# Patient Record
Sex: FEMALE | Race: BLACK OR AFRICAN AMERICAN | Employment: UNEMPLOYED | URBAN - METROPOLITAN AREA
[De-identification: names, ages, dates, MRNs, and addresses within clinical notes are randomized per-mention and may not be internally consistent; named-entity substitution may affect disease eponyms.]

---

## 2017-05-04 ENCOUNTER — ANESTHESIA EVENT (OUTPATIENT)
Dept: ENDOSCOPY | Age: 42
End: 2017-05-04
Payer: MEDICAID

## 2017-05-05 ENCOUNTER — ANESTHESIA (OUTPATIENT)
Dept: ENDOSCOPY | Age: 42
End: 2017-05-05
Payer: MEDICAID

## 2017-05-05 ENCOUNTER — HOSPITAL ENCOUNTER (OUTPATIENT)
Age: 42
Setting detail: OUTPATIENT SURGERY
Discharge: HOME | End: 2017-05-05
Attending: INTERNAL MEDICINE | Admitting: INTERNAL MEDICINE
Payer: MEDICAID

## 2017-05-05 VITALS
DIASTOLIC BLOOD PRESSURE: 66 MMHG | RESPIRATION RATE: 18 BRPM | HEART RATE: 60 BPM | OXYGEN SATURATION: 100 % | WEIGHT: 232.06 LBS | SYSTOLIC BLOOD PRESSURE: 112 MMHG | TEMPERATURE: 97.4 F | BODY MASS INDEX: 36.42 KG/M2 | HEIGHT: 67 IN

## 2017-05-05 PROBLEM — R19.4 CHANGE IN BOWEL HABITS: Status: ACTIVE | Noted: 2017-05-05

## 2017-05-05 LAB
BUN BLD-MCNC: 4 MG/DL (ref 7–18)
CHLORIDE BLD-SCNC: 95 MMOL/L (ref 100–108)
GLUCOSE BLD STRIP.AUTO-MCNC: 99 MG/DL (ref 74–106)
HCT VFR BLD CALC: 36 % (ref 36–49)
HGB BLD-MCNC: 12.2 G/DL (ref 12–16)
POTASSIUM BLD-SCNC: 2.6 MMOL/L (ref 3.5–5.5)
POTASSIUM SERPL-SCNC: 3.1 MMOL/L (ref 3.5–5.5)
SODIUM BLD-SCNC: 140 MMOL/L (ref 136–145)

## 2017-05-05 PROCEDURE — 74011250636 HC RX REV CODE- 250/636: Performed by: NURSE ANESTHETIST, CERTIFIED REGISTERED

## 2017-05-05 PROCEDURE — 82947 ASSAY GLUCOSE BLOOD QUANT: CPT

## 2017-05-05 PROCEDURE — 77030011640 HC PAD GRND REM COVD -A: Performed by: INTERNAL MEDICINE

## 2017-05-05 PROCEDURE — 84132 ASSAY OF SERUM POTASSIUM: CPT | Performed by: ANESTHESIOLOGY

## 2017-05-05 PROCEDURE — 88305 TISSUE EXAM BY PATHOLOGIST: CPT | Performed by: INTERNAL MEDICINE

## 2017-05-05 PROCEDURE — 76040000007: Performed by: INTERNAL MEDICINE

## 2017-05-05 PROCEDURE — 77030031670 HC DEV INFL ENDOTEK BIG60 MRTM -B: Performed by: INTERNAL MEDICINE

## 2017-05-05 PROCEDURE — 77030013991 HC SNR POLYP ENDOSC BSC -A: Performed by: INTERNAL MEDICINE

## 2017-05-05 PROCEDURE — 36415 COLL VENOUS BLD VENIPUNCTURE: CPT | Performed by: ANESTHESIOLOGY

## 2017-05-05 PROCEDURE — 76060000032 HC ANESTHESIA 0.5 TO 1 HR: Performed by: INTERNAL MEDICINE

## 2017-05-05 RX ORDER — SODIUM CHLORIDE, SODIUM LACTATE, POTASSIUM CHLORIDE, CALCIUM CHLORIDE 600; 310; 30; 20 MG/100ML; MG/100ML; MG/100ML; MG/100ML
100 INJECTION, SOLUTION INTRAVENOUS CONTINUOUS
Status: DISCONTINUED | OUTPATIENT
Start: 2017-05-05 | End: 2017-05-05 | Stop reason: HOSPADM

## 2017-05-05 RX ORDER — POTASSIUM CHLORIDE 1.5 G/1.77G
40 POWDER, FOR SOLUTION ORAL
Status: DISCONTINUED | OUTPATIENT
Start: 2017-05-05 | End: 2017-05-05

## 2017-05-05 RX ORDER — PANTOPRAZOLE SODIUM 40 MG/1
40 TABLET, DELAYED RELEASE ORAL DAILY
COMMUNITY
End: 2020-06-24

## 2017-05-05 RX ORDER — LIDOCAINE HYDROCHLORIDE 10 MG/ML
0.1 INJECTION, SOLUTION EPIDURAL; INFILTRATION; INTRACAUDAL; PERINEURAL AS NEEDED
Status: DISCONTINUED | OUTPATIENT
Start: 2017-05-05 | End: 2017-05-05 | Stop reason: HOSPADM

## 2017-05-05 RX ADMIN — SODIUM CHLORIDE, SODIUM LACTATE, POTASSIUM CHLORIDE, AND CALCIUM CHLORIDE: 600; 310; 30; 20 INJECTION, SOLUTION INTRAVENOUS at 13:30

## 2017-05-05 NOTE — DISCHARGE INSTRUCTIONS
For the next 12 hours you should not:   1. drive   2. drink alcohol   3. operate any machinery   4. engage in activities that require mental sharpness or manual dexterity    5. take any drugs other than those prescribed by a physician   6. make any legal or financial decisions    Call your doctor's office immediately, if:   1. Severe rectal bleeding   2. High fever   3. Severe abdominal pain    Take it easy today and resume normal activity tomorrow. Resume previous diet. Home Melendez MD, FACP    DISCHARGE SUMMARY from Nurse    The following personal items are in your possession at time of discharge:    Dental Appliances: None  Visual Aid: Glasses                            PATIENT INSTRUCTIONS:    After general anesthesia or intravenous sedation, for 24 hours or while taking prescription Narcotics:  · Limit your activities  · Do not drive and operate hazardous machinery  · Do not make important personal or business decisions  · Do  not drink alcoholic beverages  · If you have not urinated within 8 hours after discharge, please contact your surgeon on call. Report the following to your surgeon:  · Excessive pain, swelling, redness or odor of or around the surgical area  · Temperature over 100.5  · Nausea and vomiting lasting longer than 4 hours or if unable to take medications  · Any signs of decreased circulation or nerve impairment to extremity: change in color, persistent  numbness, tingling, coldness or increase pain  · Any questions        What to do at Home:        These are general instructions for a healthy lifestyle:    No smoking/ No tobacco products/ Avoid exposure to second hand smoke    Surgeon General's Warning:  Quitting smoking now greatly reduces serious risk to your health.     Obesity, smoking, and sedentary lifestyle greatly increases your risk for illness    A healthy diet, regular physical exercise & weight monitoring are important for maintaining a healthy lifestyle    You may be retaining fluid if you have a history of heart failure or if you experience any of the following symptoms:  Weight gain of 3 pounds or more overnight or 5 pounds in a week, increased swelling in our hands or feet or shortness of breath while lying flat in bed. Please call your doctor as soon as you notice any of these symptoms; do not wait until your next office visit. Recognize signs and symptoms of STROKE:    F-face looks uneven    A-arms unable to move or move unevenly    S-speech slurred or non-existent    T-time-call 911 as soon as signs and symptoms begin-DO NOT go       Back to bed or wait to see if you get better-TIME IS BRAIN. Warning Signs of HEART ATTACK     Call 911 if you have these symptoms:   Chest discomfort. Most heart attacks involve discomfort in the center of the chest that lasts more than a few minutes, or that goes away and comes back. It can feel like uncomfortable pressure, squeezing, fullness, or pain.  Discomfort in other areas of the upper body. Symptoms can include pain or discomfort in one or both arms, the back, neck, jaw, or stomach.  Shortness of breath with or without chest discomfort.  Other signs may include breaking out in a cold sweat, nausea, or lightheadedness. Don't wait more than five minutes to call 911 - MINUTES MATTER! Fast action can save your life. Calling 911 is almost always the fastest way to get lifesaving treatment. Emergency Medical Services staff can begin treatment when they arrive -- up to an hour sooner than if someone gets to the hospital by car. The discharge information has been reviewed with the patient. The patient verbalized understanding. Discharge medications reviewed with the patient and appropriate educational materials and side effects teaching were provided. Patient armband removed and given to patient to take home.   Patient was informed of the privacy risks if armband lost or stolen

## 2017-05-05 NOTE — ANESTHESIA PREPROCEDURE EVALUATION
Anesthetic History   No history of anesthetic complications            Review of Systems / Medical History  Patient summary reviewed and pertinent labs reviewed    Pulmonary  Within defined limits                 Neuro/Psych   Within defined limits           Cardiovascular    Hypertension              Exercise tolerance: >4 METS     GI/Hepatic/Renal     GERD           Endo/Other        Obesity    Comments: hypokalemia Other Findings   Comments:   Risk Factors for Postoperative nausea/vomiting:       History of postoperative nausea/vomiting? NO       Female? YES       Motion sickness? NO       Intended opioid administration for postoperative analgesia? NO      Smoking Abstinence  Current Smoker? NO  Elective Surgery? YES  Seen preoperatively by anesthesiologist or proxy prior to day of surgery? YES  Pt abstained from smoking 24 hours prior to anesthesia?  YES               Physical Exam    Airway  Mallampati: II  TM Distance: 4 - 6 cm  Neck ROM: normal range of motion   Mouth opening: Normal     Cardiovascular    Rhythm: regular  Rate: normal         Dental  No notable dental hx       Pulmonary  Breath sounds clear to auscultation               Abdominal  GI exam deferred       Other Findings            Anesthetic Plan    ASA: 2  Anesthesia type: MAC          Induction: Intravenous  Anesthetic plan and risks discussed with: Patient

## 2017-05-05 NOTE — ANESTHESIA POSTPROCEDURE EVALUATION
Post-Anesthesia Evaluation & Assessment    Visit Vitals    /65 (BP 1 Location: Left arm, BP Patient Position: At rest)    Pulse 66    Temp 36.3 °C (97.4 °F)    Resp 18    Ht 5' 7\" (1.702 m)    Wt 105.3 kg (232 lb 1 oz)    SpO2 100%    BMI 36.35 kg/m2       Nausea/Vomiting: no nausea and no vomiting    Pain score (VAS): 0    Post-operative hydration adequate.     Mental status & Level of consciousness: orientation per pre-anesthetic level    Neurological status: moves all extremities, sensation grossly intact    Pulmonary status: airway patent, no supplemental oxygen required    Complications related to anesthesia: none    Additional comments:        Kofi Poe CRNA  May 5, 2017

## 2017-05-05 NOTE — IP AVS SNAPSHOT
[address] [place] [address] [address] 
[phone] Patient: Dorina Thomas MRN: [id number] PBB:7[birth year]89/[birth year]26 You are allergic to the following No active allergies Recent Documentation Height Weight BMI OB Status Smoking Status 1.702 m 105.3 kg 36.35 kg/m2 Hysterectomy Never Smoker Emergency Contacts Name Discharge Info Relation Home Work Mobile Fidencio Sandoval DISCHARGE CAREGIVER [3] Boyfriend [17]   [phone] [address] CAREGIVER [3] Parent [1] [phone] About your hospitalization You were admitted on: May 5, 2017 You last received care in the:  [facility] PHASE 2 RECOVERY You were discharged on: May 5, 2017 Unit phone number:  [phone] Why you were hospitalized Your primary diagnosis was:  Change In Bowel Habits Providers Seen During Your Hospitalizations Provider Role Specialty Primary office phone Mansi Larsen MD Attending Provider Gastroenterology [phone] Your Primary Care Physician (PCP) Primary Care Physician Office Phone Office Fax OTHER, PHYS ** None ** ** None ** Follow-up Information Follow up With Details Comments Contact Info Praveena Medina MD Schedule an appointment as soon as possible for a visit  [facility] [address] [phone] Mansi Larsen MD Schedule an appointment as soon as possible for a visit in 3 months  [facility] [phone] Phys MD Robin   Patient can only remember the practice name and not the physician Current Discharge Medication List  
  
CONTINUE these medications which have NOT CHANGED Dose & Instructions Dispensing Information Comments Morning Noon Evening Bedtime  
 atenolol-chlorthalidone 50-25 mg per tablet Commonly known as:  Phylicia Mojica Your last dose was: Your next dose is:    
   
   
 Dose:  1 Tab Take 1 Tab by mouth daily. Refills:  0  
     
   
   
   
  
 potassium chloride 20 mEq tablet Commonly known as:  K-DUR, KLOR-CON Your last dose was: Your next dose is:    
   
   
 Dose:  20 mEq Take 20 mEq by mouth daily. Refills:  0 PROTONIX 40 mg tablet Generic drug:  pantoprazole Your last dose was: Your next dose is:    
   
   
 Dose:  40 mg Take 40 mg by mouth daily. Refills:  0 Discharge Instructions For the next 12 hours you should not: 1. drive 2. drink alcohol 3. operate any machinery 4. engage in activities that require mental sharpness or manual dexterity 5. take any drugs other than those prescribed by a physician 6. make any legal or financial decisions Call your doctor's office immediately, if: 1. Severe rectal bleeding 2. High fever 3. Severe abdominal pain Take it easy today and resume normal activity tomorrow. Resume previous diet. Home Oneil MD, [place] DISCHARGE SUMMARY from Nurse The following personal items are in your possession at time of discharge: 
 
Dental Appliances: None Visual Aid: Glasses PATIENT INSTRUCTIONS: 
 
After general anesthesia or intravenous sedation, for 24 hours or while taking prescription Narcotics: · Limit your activities · Do not drive and operate hazardous machinery · Do not make important personal or business decisions · Do  not drink alcoholic beverages · If you have not urinated within 8 hours after discharge, please contact your surgeon on call. Report the following to your surgeon: 
· Excessive pain, swelling, redness or odor of or around the surgical area · Temperature over 100.5 · Nausea and vomiting lasting longer than 4 hours or if unable to take medications · Any signs of decreased circulation or nerve impairment to extremity: change in color, persistent  numbness, tingling, coldness or increase pain · Any questions What to do at Home: These are general instructions for a healthy lifestyle: No smoking/ No tobacco products/ Avoid exposure to second hand smoke Surgeon General's Warning:  Quitting smoking now greatly reduces serious risk to your health. Obesity, smoking, and sedentary lifestyle greatly increases your risk for illness A healthy diet, regular physical exercise & weight monitoring are important for maintaining a healthy lifestyle You may be retaining fluid if you have a history of heart failure or if you experience any of the following symptoms:  Weight gain of 3 pounds or more overnight or 5 pounds in a week, increased swelling in our hands or feet or shortness of breath while lying flat in bed. Please call your doctor as soon as you notice any of these symptoms; do not wait until your next office visit. Recognize signs and symptoms of STROKE: 
 
F-face looks uneven A-arms unable to move or move unevenly S-speech slurred or non-existent T-time-call 911 as soon as signs and symptoms begin-DO NOT go Back to bed or wait to see if you get better-TIME IS BRAIN. Warning Signs of HEART ATTACK Call 911 if you have these symptoms: 
? Chest discomfort. Most heart attacks involve discomfort in the center of the chest that lasts more than a few minutes, or that goes away and comes back. It can feel like uncomfortable pressure, squeezing, fullness, or pain. ? Discomfort in other areas of the upper body. Symptoms can include pain or discomfort in one or both arms, the back, neck, jaw, or stomach. ? Shortness of breath with or without chest discomfort. ? Other signs may include breaking out in a cold sweat, nausea, or lightheadedness. Don't wait more than five minutes to call [address]!  Fast action can save your life. Calling 911 is almost always the fastest way to get lifesaving treatment. Emergency Medical Services staff can begin treatment when they arrive  up to an hour sooner than if someone gets to the hospital by car. The discharge information has been reviewed with the patient. The patient verbalized understanding. Discharge medications reviewed with the patient and appropriate educational materials and side effects teaching were provided. Patient armband removed and given to patient to take home. Patient was informed of the privacy risks if armband lost or stolen Discharge Orders None Introducing [facility]! Toribio Nyhan introduces [employer] patient portal. Now you can access parts of your medical record, email your doctor's office, and request medication refills online. 1. In your internet browser, go to [email or web address]. [email or web address] 2. Click on the First Time User? Click Here link in the Sign In box. You will see the New Member Sign Up page. 3. Enter your [employer] Access Code exactly as it appears below. You will not need to use this code after youve completed the sign-up process. If you do not sign up before the expiration date, you must request a new code. · [employer] Access Code: [email or web address] Expires: 7/30/2017 12:36 PM 
 
4. Enter the last four digits of your Social Security Number (xxxx) and Date of Birth (mm/dd/yyyy) as indicated and click Submit. You will be taken to the next sign-up page. 5. Create a [employer] ID. This will be your [employer] login ID and cannot be changed, so think of one that is secure and easy to remember. 6. Create a [employer] password. You can change your password at any time. 7. Enter your Password Reset Question and Answer. This can be used at a later time if you forget your password. 8. Enter your e-mail address.  You will receive e-mail notification when new information is available in [employer]. 9. Click Sign Up. You can now view and download portions of your medical record. 10. Click the Download Summary menu link to download a portable copy of your medical information. If you have questions, please visit the Frequently Asked Questions section of the [employer] website. Remember, [employer] is NOT to be used for urgent needs. For medical emergencies, dial 911. Now available from your iPhone and Android! General Information Please provide this summary of care documentation to your next provider. Patient Signature:  ____________________________________________________________ Date:  ____________________________________________________________  
  
Massiel Brought Provider Signature:  ____________________________________________________________ Date:  ____________________________________________________________

## 2017-05-05 NOTE — H&P
History and Physical    Renteria Click  1975  [id number]  [id number]    Assessment:  Change in bowel habits    Treatment/Plan:  Colonoscopy    History of present illness:  40 y.o. female here for diagnostic colonoscopy to eval her change in bowel habits. ROS: No chest pain, shortness of breath, headaches, dizziness, lightheadedness, nausea, vomiting or abdominal pain. Evaluation of past illnesses, surgeries, or injuries:  yes    Past Medical History:   Diagnosis Date    GERD (gastroesophageal reflux disease)     Hypertension     Low blood potassium        Past Surgical History:   Procedure Laterality Date    HX  SECTION      HX HYSTERECTOMY         Allergies:  No Known Allergies    Previous reactions to sedation/analgesia? no     Review of current medications, supplement, herbals and nutraceuticals complete:  yes    Pertinent labs reviewed? yes    History of active substance abuse?  no    History reviewed. No pertinent family history. Social History     Social History    Marital status:      Spouse name: N/A    Number of children: N/A    Years of education: N/A     Occupational History    Not on file. Social History Main Topics    Smoking status: Never Smoker    Smokeless tobacco: Never Used    Alcohol use Yes    Drug use: No    Sexual activity: Not on file     Other Topics Concern    Not on file     Social History Narrative       Examination:  Cardiac Status:  WNL    Mental Status:  WNL     Pulmonary Status:  WNL    NPO:  >12    Home Miner MD, [place]  2017  12:37 PM

## 2017-05-05 NOTE — PROCEDURES
Colonoscopy Report     Date of Procedure:2017       Patient: Tonia Grider  Date of Birth: @     MRN: [id number]   Age: 40 y.o.   SSN: [id number]  Sex: female            FINDINGS & IMPRESSION:  1.   10 mm sessile polyp at the mesenteric side of the hepatic flexure -- hot snare resected. 2.   Mild sigmoid diverticulosis. 3.   Small hemorrhoids. 4.   Normal terminal ileum. RECOMMENDATIONS  1. Resume all home medications and diet. Continue daily Citrucel. Drink 5-6 glasses water daily. 2. Await results of biopsies. Patient advised results will be mailed in 1-2 weeks. 3. Repeat colonoscopy in 3 years if polyp has villous histology. Repeat in 5 years if a non-villous adenoma. Repeat in 10 years if hyperplastic. 4. RTO in 3 months. Indication:  Change in bowel habits  Procedure Performed: Colonoscopy polypectomy (snare cautery)  Endoscopist: Ca Gunn MD  Assistant: Endoscopy Technician-1: Dara Eugene  Endoscopy RN-1: Gus Larios RN  ASA: ASA 2 - Patient with mild systemic disease with no functional limitations  Mallampati Score: II (soft palate, uvula, fauces visible)  Anesthesia: MAC anesthesia Propofol  Endoscope: [id number]  Extent of Examination:cecum, which was identified by the ileocecal valve and appendiceal orifice, terminal ileum  Quality of Preparation:     [x]  Excellent   []  Very Good   [] Fair but adequate   [] Fair, inadequate   []  Poor      Technique: Informed consent was obtained. A safety timeout was performed. The patient was placed in left lateral position. A perianal inspection and a digital rectal exam were performed. The patients vital signs were monitored at all times including heart rate, rhythm, blood pressure and oxygen saturation. Sedation/anesthesia was administered.   When adequate sedation was achieved the video colonoscope was introduced into the rectum and advanced under direct vision up to the cecum, which was identified by the ileocecal valve and appendiceal orifice. The cecum was identified by ileocecal valve, appendiceal orifice and confluence of the colonic folds. The terminal ileum was intubated. With adequate insufflation and maneuvering of the withdrawing scope, the colonic mucosa was visualized carefully. Retroflexion was performed in the rectum and the distal rectum visualized. The patient tolerated the procedure very well and was transferred to recovery area. EBL:   Mild    Specimen:   ID Type Source Tests Collected by Time Destination   1 : (hot snare) polyp  Preservative Hepatic Flexure  Tabitha Kocher, MD 5/5/2017 1407 Pathology         Christiano Wright MD, [place] [facility]  [facility]  May 5, 2017  2:13 PM

## 2018-08-09 PROBLEM — E66.01 SEVERE OBESITY (BMI 35.0-39.9): Status: ACTIVE | Noted: 2018-08-09

## 2019-03-28 PROBLEM — R73.03 PRE-DIABETES: Status: ACTIVE | Noted: 2019-01-13

## 2019-03-28 PROBLEM — G47.33 OSA (OBSTRUCTIVE SLEEP APNEA): Status: ACTIVE | Noted: 2018-01-26

## 2019-04-04 ENCOUNTER — HOSPITAL ENCOUNTER (OUTPATIENT)
Dept: PHYSICAL THERAPY | Age: 44
Discharge: HOME | End: 2019-04-04
Payer: MEDICAID

## 2019-04-04 PROCEDURE — 97112 NEUROMUSCULAR REEDUCATION: CPT

## 2019-04-04 PROCEDURE — 97161 PT EVAL LOW COMPLEX 20 MIN: CPT

## 2019-04-04 NOTE — PROGRESS NOTES
PELVIC FLOOR DAILY TREATMENT NOTE 8-14    Patient Name: Angel Goldberg  Date:2019  : 1975  [x]  Patient  Verified  Payor: [place]yenne Arredondo / Plan: [facility] MEDICAID / Product Type: Managed Care Medicaid /    In time:12:16  Out time:1:08  Total Treatment Time (min): 52  Total Timed Codes (min): na  1:1 Treatment Time (min): na   Visit #: 1 of 8    Treatment Area: Stress incontinence (female) (male) [N39.3]    SUBJECTIVE  Pain Level (0-10 scale): 0  Any medication changes, allergies to medications, adverse drug reactions, diagnosis change, or new procedure performed?: [x] No    [] Yes (see summary sheet for update)  Subjective functional status/changes:   [] No changes reported  See medical history    OBJECTIVE            10 min Patient Education: [x] Review HEP    [] Progressed/Changed HEP based on: Educated Pt in pelvic floor anatomy, function/dysfunction and correct execution of a pelvic floor contraction. Reviewed biofeedback results. [] positioning   [] body mechanics   [] transfers   [] heat/ice application        Other Objective/Functional Measures: See POC    Pain Level (0-10 scale) post treatment: 0    ASSESSMENT/Changes in Function: Justification for Eval Code Complexity:  Patient History : See POC  Examination see exam   Clinical Presentation: stable  Clinical Decision Making : FOTO : 62 /100      Patient will continue to benefit from skilled PT services to modify and progress therapeutic interventions, address strength deficits, instruct in home and community integration and address TUTU to attain remaining goals. [x]  See Plan of Care  []  See progress note/recertification  []  See Discharge Summary         Progress towards goals / Updated goals:  Initial evaluation completed with home exercise program and education initiated.       PLAN  [x]  Upgrade activities as tolerated     []  Continue plan of care  []  Update interventions per flow sheet       []  Discharge due to:_  []  Other:_ Angy Colbert, PT 4/4/2019  1:08 PM      Future Appointments   Date Time Provider Hiro Audelia   4/8/2019  2:30 PM Bora Ele, PTA [facility]   4/18/2019 10:45 AM Meliton Richardson, PT [facility]   4/26/2019  3:00 PM Bora Ele, PTA [facility]   5/1/2019  7:30 AM Meliton Richardson, PT [facility]   5/6/2019  1:00 PM Bora Ele, PTA [facility]   5/13/2019  1:00 PM Bora Ele, PTA [facility]   5/20/2019  1:00 PM Bora Ele, PTA [facility]   7/29/2019  8:00 AM Reji, Nicol Sorensen, NP [address]

## 2019-04-04 NOTE — PROGRESS NOTES
[address]  THERAPY AT [facility], [address] [facility] [address] - Phone: [phone]  Fax: [phone] / [address] [facility]  Patient Name: Mark Anthony Darnell : 1975   Medical   Diagnosis: Stress incontinence (female) (male) [N39.3] Treatment Diagnosis: Stress incontinence (female) (male) [N39.3]   Onset Date: 3/29/2019     Referral Source: [place], NP Start of Care [facility]): 2019   Prior Hospitalization: See medical history Provider #: [id number]   Prior Level of Function: Chronic TUTU   Comorbidities: G3, P3, Hysterectomy   Medications: Verified on Patient Summary List   The Plan of Care and following information is based on the information from the initial evaluation.   ==================================================================================  Assessment / key information: Patient is a 44 y.o. yo female  with 2 vaginal deliveries and 1  section who presents to [facility] with diagnosis of Stress incontinence (female) (male) [N39.3]. Patient reports urinary leaking with hard coughing and sexual intercourse and nocturia 2x nightly. She denies urgency or perineal pressure. Patient presents to PT with moderately impaired strength of pelvic floor muscles. On biofeedback there was low  net rise of fast twitch contraction at 7.79 microvolts and low net rise of slow twitch contraction at 7.76 microvolts. Patient declined internal evaluation of pelvic floor. Patient scored 57 on FOTO/Urinary problem indicating decreased quality of life.   Patient can benefit from PT for retraining of muscle control on biofeedback to increase pelvic floor muscle strength, decrease urinary incontinence and nocturia.    ==================================================================================  Eval Complexity: History: MEDIUM  Complexity : 1-2 comorbidities / personal factors will impact the outcome/ POC Exam:LOW Complexity : 1-2 Standardized tests and measures addressing body structure, function, activity limitation and / or participation in recreation  Presentation: LOW Complexity : Stable, uncomplicated  Clinical Decision Making:MEDIUM Complexity : FOTO score of 26-74Overall Complexity:LOW   Problem List: Pelvic pain/dysfunction, Decreased pelvic floor mm awareness, Decreased pelvic floor mm strength and Other  Treatment Plan may include any combination of the following: Therapeutic exercise, Neuromuscular re-education, Manual therapy, Physical agent/modality, Patient education and Other  Patient / Family readiness to learn indicated by: asking questions, trying to perform skills and interest  Persons(s) to be included in education: patient (P)  Barriers to Learning/Limitations: None  Measures taken:    Patient Goal (s): \"To stop peeing on myself when I cough\". Patient self reported health status: good  Rehabilitation Potential: excellent  Short Term Goals: To be accomplished in 4 weeks:   1. Patient performing pelvic floor exercises TID. 2. Patient will report 25% subjective improvement in urinary incontinence with coughing and sexual intercourse. 3. Increase net rise of fast twitch contraction to 10 microvolts to increase continence. 4. Patient doing sleep longer exercises. .  Long Term Goals: To be accomplished in 8 weeks:   1. Patient independent in HEP     2. Patient will increase score on FOTO/Urinary problem to 63 indicating improved continence and quality of life. 3. Increase net rise of fast twitch contraction to 12 microvolts to increase continence. 4. Nocturia decreased to 0- 1x nightly. Frequency / Duration:   Patient to be seen  1  times per week for 8  weeks:  Patient / Caregiver education and instruction:Exercises  G-Codes (GP): leeanne    Therapist Signature:  [place] OZIEL Amanda Date: 2/6/1722   Certification Period: leeanne Time: 12:18 PM ==================================================================================  I certify that the above Physical Therapy Services are being furnished while the patient is under my care. I agree with the treatment plan and certify that this therapy is necessary. Physician Signature:        Date:       Time:     [facility] or you may fax the signed copy to [phone]. Thank you. To ensure your patient receives the highest quality care and to avoid disruption in therapy please sign and return this plan of care within 21 days. Per Medicaid guidelines if the plan of care is not received within 21 days the patient's care must be put on hold until signed.

## 2019-04-08 ENCOUNTER — HOSPITAL ENCOUNTER (OUTPATIENT)
Dept: PHYSICAL THERAPY | Age: 44
Discharge: HOME | End: 2019-04-08
Payer: MEDICAID

## 2019-04-08 PROCEDURE — 97110 THERAPEUTIC EXERCISES: CPT

## 2019-04-08 PROCEDURE — 97112 NEUROMUSCULAR REEDUCATION: CPT

## 2019-04-08 NOTE — PROGRESS NOTES
PELVIC FLOOR DAILY TREATMENT NOTE     Patient Name: Miguel Eduardo  Date:2019  : 1975  [x]  Patient  Verified  Payor: Sophy Mello / Plan: salgomed / Product Type: Managed Care Medicaid /    In time:2:38 pm  Out time:3:25 pm  Total Treatment Time (min): 43  Total Timed Codes (min): 43  1:1 Treatment Time (min): 43   Visit #: 2 of 8    Treatment Area: Stress incontinence (female) (male) [N39.3]    SUBJECTIVE  Pain Level (0-10 scale): 0/10  Any medication changes, allergies to medications, adverse drug reactions, diagnosis change, or new procedure performed?: [x] No    [] Yes (see summary sheet for update)  Subjective functional status/changes:   [] No changes reported  Pt reports doing HEP 3x per day.  As long as I don't get sick    OBJECTIVE  Modality rationale:  Biofeedback for retraining of muscle control on biofeedback to increase pelvic floor muscle strength, decrease urinary incontinence and nocturia       Min Type Additional Details   33 [x] Biofeedback x33 minutes    supine surface    [] Estim: []Att   []Unatt        []TENS instruct                  []IFC  []Premod   []NMES                     []Other:  []w/US   []w/ice   []w/heat  Position:  Location:    []  Traction: [] Cervical       []Lumbar                       [] Prone          []Supine                       []Intermittent   []Continuous Lbs:  [] before manual  [] after manual    []  Ultrasound: []Continuous   [] Pulsed                           []1MHz   []3MHz Location:  W/cm2:    []  Iontophoresis with dexamethasone         Location: [] Take home patch   [] In clinic    []  Ice     []  heat  []  Ice massage Position:  Location:    []  Vasopneumatic Device Pressure:       [] lo [] med [] hi   Temperature: [] lo [] med [] hi   [x] Skin assessment post-treatment:  []intact []redness- no adverse reaction       []redness - adverse reaction:     7 min Therapeutic Exercise:  [x] See flow sheet :  []  Pelvic floor strengthening []  Pelvic floor downtraining  []  Quality pelvic floor contractions      []  Relaxation techniques  []  Urge suppression exercises  [x]  Other: Core strengthening   Rationale: increase ROM and increase strength to improve the patients ability to improve core strength in ADLS, UI      3 min Bladder Training: [] voiding schedule          [] program progression                                  [x]review sleep longer strategy             min Patient Education: [x] Review HEP    [] Progressed/Changed HEP based on:   [] positioning   [] body mechanics   [] transfers   [] heat/ice application        Other Objective/Functional Measures:    []baseline resting tone: -   [x]slow twitch mms 4.73 ( 3.18)   [x]fast twitch mms3.77( -4.43)    Pain Level (0-10 scale) post treatment: 0    ASSESSMENT/Changes in Function: pt demonstrating improving pelvic floor engagement with use of hip adduction. Slow twitch mms improving from 4.73 to 14.5 and fast twitch mms improving from 3.77 to 19.5. Reviewed sleep longer strategies. Pt verbalizaed good understanding of written HEP. Add hip ADD, abdominal draw to progress core strength    Patient will continue to benefit from skilled PT services to modify and progress therapeutic interventions, address functional mobility deficits, address ROM deficits, address strength deficits, analyze and address soft tissue restrictions and instruct in home and community integration to attain remaining goals. []  See Plan of Care  []  See progress note/recertification  []  See Discharge Summary         Progress towards goals / Updated goals:  First visit from initial evaluation.  Progressed treatment per plan of care    PLAN  [x]  Upgrade activities as tolerated     [x]  Continue plan of care  []  Update interventions per flow sheet       []  Discharge due to:_  []  Other:_      Bandar Sen, LISETTE 4/8/2019  2:38 PM      Future Appointments   Date Time Provider Hiro Win 4/18/2019 10:45 AM [facility]   4/26/2019  3:00 PM Claudia Morales PTA [facility]   5/1/2019  7:30 AM Gorge Almonte, PT [facility]   5/6/2019  1:00 PM Claudia Morales PTA [facility]   5/13/2019  1:00 PM Claudia Morales PTA [facility]   5/20/2019  1:00 PM Claudia Morales PTA [facility]   7/29/2019  8:00 AM [place], Dominga Perkins, NP [address] [facility]

## 2019-04-18 ENCOUNTER — HOSPITAL ENCOUNTER (OUTPATIENT)
Dept: PHYSICAL THERAPY | Age: 44
Discharge: HOME | End: 2019-04-18
Payer: MEDICAID

## 2019-04-18 PROCEDURE — 97112 NEUROMUSCULAR REEDUCATION: CPT

## 2019-04-18 PROCEDURE — 97110 THERAPEUTIC EXERCISES: CPT

## 2019-04-18 NOTE — PROGRESS NOTES
PELVIC FLOOR DAILY TREATMENT NOTE 8-    Patient Name: Gregorio Rutherford  Date:2019  : 1975  [x]  Patient  Verified  Payor: Radha Trinidad / Plan: [id number] / Product Type: Managed Care Medicaid /    In time:10:59 am  Out time: 11:32 am  Total Treatment Time (min): 33  Total Timed Codes (min): na  1:1 Treatment Time (min): na   Visit #: 4 of 8    Treatment Area: Stress incontinence (female) (male) [N39.3]    SUBJECTIVE  Pain Level (0-10 scale): 0/10  Any medication changes, allergies to medications, adverse drug reactions, diagnosis change, or new procedure performed?: [x] No    [] Yes (see summary sheet for update)  Subjective functional status/changes:   [] No changes reported  Pt reports that it's kind of working since having less leaking. \"I don't smell like pee anymore. \"  Did sleep longer exercises. OBJECTIVE  Modality rationale:  Biofeedback for retraining of muscle control on biofeedback to increase pelvic floor muscle strength, decrease urinary incontinence and nocturia       Min Type Additional Details   23 [x] Biofeedback x 23 minutes    supine and sit surface with accessory muscle use.     [] Estim: []Att   []Unatt        []TENS instruct                  []IFC  []Premod   []NMES                     []Other:  []w/US   []w/ice   []w/heat  Position:  Location:    []  Traction: [] Cervical       []Lumbar                       [] Prone          []Supine                       []Intermittent   []Continuous Lbs:  [] before manual  [] after manual    []  Ultrasound: []Continuous   [] Pulsed                           []1MHz   []3MHz Location:  W/cm2:    []  Iontophoresis with dexamethasone         Location: [] Take home patch   [] In clinic    []  Ice     []  heat  []  Ice massage Position:  Location:    []  Vasopneumatic Device Pressure:       [] lo [] med [] hi   Temperature: [] lo [] med [] hi   [x] Skin assessment post-treatment:  []intact []redness- no adverse reaction []redness - adverse reaction:     10 min Therapeutic Exercise:  [x] See flow sheet :  []  Pelvic floor strengthening                []  Pelvic floor downtraining  []  Quality pelvic floor contractions      []  Relaxation techniques  []  Urge suppression exercises  [x]  Other: Core strengthening   Rationale: increase ROM and increase strength to improve the patients ability to improve core strength in ADLS, UI      na min Bladder Training: [] voiding schedule          [] program progression                                  []review sleep longer strategy             min Patient Education: [x] Review HEP    [] Progressed/Changed HEP based on: Core exercises 3x week. PF 1x day supine, 2x day seated  [] positioning   [] body mechanics   [] transfers   [] heat/ice application        Other Objective/Functional Measures:    []baseline resting tone: -   [x]slow twitch mms 23(17.8) with add   [x]fast twitch mms 19.6(9.4) with add    Pain Level (0-10 scale) post treatment: 0    ASSESSMENT/Changes in Function:   Patient able to generate an improved PF contraction with accessory muscle use. She reports increased continence. Patient will continue to benefit from skilled PT services to modify and progress therapeutic interventions, address functional mobility deficits, address ROM deficits, address strength deficits, analyze and address soft tissue restrictions and instruct in home and community integration to attain remaining goals. []  See Plan of Care  []  See progress note/recertification  []  See Discharge Summary         Progress towards goals / Updated goals:                              1. Patient performing pelvic floor exercises 3x day. Met                  2. Patient will report 25% subjective improvement in urinary incontinence with coughing and sexual intercourse. 3. Increase net rise of fast twitch contraction to 10 microvolts to increase continence.  Progressing                 4. Patient doing sleep longer exercises. . Met        PLAN  [x]  Upgrade activities as tolerated     [x]  Continue plan of care  []  Update interventions per flow sheet       []  Discharge due to:_  []  Other:_      Ping Pierce, PT 4/18/2019  11:32 AM      Future Appointments   Date Time Provider Hiro Win   4/26/2019  3:00 PM Robyn Amezquita PTA [facility]   5/1/2019  7:30 AM Julieta Torres, PT [facility]   5/6/2019  1:00 PM Robyn Amezquita PTA [facility]   5/13/2019  1:00 PM Robyn Amezquita [facility]   5/20/2019  1:00 PM Robyn Amezquita [facility]   7/29/2019  8:00 AM Reji, Uri Govea, NP [address]

## 2019-04-26 ENCOUNTER — HOSPITAL ENCOUNTER (OUTPATIENT)
Dept: PHYSICAL THERAPY | Age: 44
Discharge: HOME | End: 2019-04-26
Payer: MEDICAID

## 2019-04-26 PROCEDURE — 97112 NEUROMUSCULAR REEDUCATION: CPT

## 2019-04-26 PROCEDURE — 97110 THERAPEUTIC EXERCISES: CPT

## 2019-04-26 NOTE — PROGRESS NOTES
PELVIC FLOOR DAILY TREATMENT NOTE 8    Patient Name: Maycol Jacobs  Date:2019  : 1975  [x]  Patient  Verified  Payor: Nicole Givens / Plan: Dalhart Lower / Product Type: Managed Care Medicaid /    In time:3:16 am Out time:4:07 pm  Total Treatment Time (min): 51  Total Timed Codes (min): 51  1:1 Treatment Time (min): 46   Visit #: 5of 8    Treatment Area: Stress incontinence (female) (male) [N39.3]    SUBJECTIVE  Pain Level (0-10 scale): 4/ 1- lower abdominal  Any medication changes, allergies to medications, adverse drug reactions, diagnosis change, or new procedure performed?: [x] No    [] Yes (see summary sheet for update)  Subjective functional status/changes:   [] No changes reported  No more smelling like pee pee. Its working I guess.      OBJECTIVE  Modality rationale:  Biofeedback for retraining of muscle control on biofeedback to increase pelvic floor muscle strength, decrease urinary incontinence and nocturia      Min Type Additional Details   28 [] Biofeedback x 28 minutes   supine and sit surface with accessory muscle use    [] Estim: []Att   []Unatt        []TENS instruct                  []IFC  []Premod   []NMES                     []Other:  []w/US   []w/ice   []w/heat  Position:  Location:    []  Traction: [] Cervical       []Lumbar                       [] Prone          []Supine                       []Intermittent   []Continuous Lbs:  [] before manual  [] after manual    []  Ultrasound: []Continuous   [] Pulsed                           []1MHz   []3MHz Location:  W/cm2:    []  Iontophoresis with dexamethasone         Location: [] Take home patch   [] In clinic    []  Ice     []  heat  []  Ice massage Position:  Location:    []  Vasopneumatic Device Pressure:       [] lo [] med [] hi   Temperature: [] lo [] med [] hi   [x] Skin assessment post-treatment:  [x]intact []redness- no adverse reaction       []redness - adverse reaction:     23 min Therapeutic Exercise:  [] See flow sheet :  [x]  Pelvic floor strengthening                []  Pelvic floor downtraining  []  Quality pelvic floor contractions      []  Relaxation techniques  []  Urge suppression exercises  []  Other:    Rationale: increase ROM and increase strength to improve the patients ability to decrease UI in ADLs                with NM and TE min Patient Education: [x] Review HEP    [x] Progressed/Changed HEP based on:   [] positioning   [] body mechanics   [] transfers   [] heat/ice application        Other Objective/Functional Measures:    []baseline resting tone:    [x]slow twitch mms 18.1 (9.93)   [x]fast twitch mms 17.9 (6.45)    Pain Level (0-10 scale) post treatment: 0    ASSESSMENT/Changes in Function: advanced core stabilization in supine. Updated written HEP. Pt reporting slight fatigue with slow twitch PF exercise 10x 5 second hold . However pt able to complete full 10 reps of exercise. Pt demonstrating improving PF slow twitch and fast twitch muscle strength . Pt verbalized good understanding of written HEP. Patient will continue to benefit from skilled PT services to modify and progress therapeutic interventions, address functional mobility deficits, address ROM deficits, address strength deficits, analyze and address soft tissue restrictions and instruct in home and community integration to attain remaining goals. [x]  See Plan of Care  []  See progress note/recertification  []  See Discharge Summary         Progress towards goals / Updated goals:   1. Patient performing pelvic floor exercises 3x day. Met                  2. Patient will report 25% subjective improvement in urinary incontinence with coughing and sexual intercourse.                 3. Increase net rise of fast twitch contraction to 10 microvolts to increase continence. Progressing                 4. Patient doing sleep longer exercises. . Met        PLAN  [x]  Upgrade activities as tolerated     []  Continue plan of care  []  Update interventions per flow sheet       []  Discharge due to:_  []  Other:_      Leela Restrepo PTA 4/26/2019  4:07  PM      Future Appointments   Date Time Provider Hiro Win   5/1/2019  7:30 AM Bijal King, PT [facility]   5/6/2019  1:00 PM Fabiola Najera PTA [facility]   5/13/2019  1:00 PM Fabiola Najera PTA [facility]   5/20/2019  1:00 PM Fabiola Najera PTA [facility]   7/29/2019  8:00 AM Reji, Jewels Herbert, NP [address] [facility]

## 2019-05-01 ENCOUNTER — APPOINTMENT (OUTPATIENT)
Dept: PHYSICAL THERAPY | Age: 44
End: 2019-05-01
Payer: MEDICAID

## 2019-05-03 ENCOUNTER — HOSPITAL ENCOUNTER (OUTPATIENT)
Dept: PHYSICAL THERAPY | Age: 44
Discharge: HOME | End: 2019-05-03
Payer: MEDICAID

## 2019-05-03 PROCEDURE — 97112 NEUROMUSCULAR REEDUCATION: CPT

## 2019-05-03 NOTE — PROGRESS NOTES
[facility] AT [facility] [address], [address] [facility], [place] [address] - Phone: [phone]  Fax: [phone]  PROGRESS NOTE  Patient Name: Maycol Jacobs : 1975   Treatment/Medical Diagnosis: Stress incontinence (female) (male) [N39.3]   Referral Source: [place], NP     Date of Initial Visit: 2019 Attended Visits: 5 Missed Visits: 0   SUMMARY OF TREATMENT  PT has consisted of pelvic floor relaxation/strengthening via biofeedback, education as to  pelvic floor anatomy and function/decrease urinary incontinence and nocturia, and home exercise program.   CURRENT STATUS  Patient has made slow steady progress  progress in PT with a;; short term goals. Functional progress  Includes patient reporting 25 % improvement in urinary incontinence with sexual intercourse and Nocturia has decreased to 0 x nightly. Patient demonstrates improved pelvic floor muscle strength. Goal/Measure of Progress Goal Met? 1. Patient performing pelvic floor exercises 3x day   Status at last Eval: na Current Status: HEP 3x per day yes   2. Patient will report 25% subjective improvement in urinary incontinence with sexual intercourse. Status at last Eval: na Current Status: ~25% improvement yes   3. Increase net rise of fast twitch contraction to 10  microvolts to increase continence. 4.  Patient using urge suppression techniques. Status at last Eval: 12.5 Current Status: 15.1 yes   New Goals to be achieved in __4_  weeks:  1. Patient independent in home exercise program.   2.  Patient will increase score on FOTO/Urinary problem   to 63  indicating improved continuence and quality of life. 3.  Increase net rise of fast  twitch contraction to 16  microvolts to increase continence. RECOMMENDATIONS  Continue pelvic floor PT1x week for 4 weeks. If you have any questions/comments please contact us directly at [phone].    Thank you for allowing us to assist in the care of your patient. Therapist Signature: Kim Lemus PTA Date: 5/3/2019    Janina Carr, OZIEL Time: 3:24 PM   NOTE TO PHYSICIAN:  PLEASE COMPLETE THE ORDERS BELOW AND FAX TO   [facility]: [phone]. If you are unable to process this request in 24 hours please contact our office: [phone].    ___ I have read the above report and request that my patient continue as recommended.   ___ I have read the above report and request that my patient continue therapy with the following changes/special instructions:_________________________________________________________   ___ I have read the above report and request that my patient be discharged from therapy.      Physician Signature:        Date:       Time:

## 2019-05-03 NOTE — PROGRESS NOTES
PELVIC FLOOR DAILY TREATMENT NOTE 8-14    Patient Name: Suzanne Habermann  Date:5/3/2019  : 1975  [x]  Patient  Verified  Payor: Vibhanaila Narayanan / Plan: Cashback [place] / Product Type: Managed Care Medicaid /    In time:3:22 pm  Out time:4:18 pm  Total Treatment Time (min):56   Total Timed Codes (min):56  1:1 Treatment Time (min): 34  Visit #: 5 of 8    Treatment Area: Stress incontinence (female) (male) [N39.3]    SUBJECTIVE  Pain Level (0-10 scale): 0  Any medication changes, allergies to medications, adverse drug reactions, diagnosis change, or new procedure performed?: [x] No    [] Yes (see summary sheet for update)  Subjective functional status/changes:   [] No changes reported  Pt reports no episodes of nocturia. Doing HEP 3x per day.     OBJECTIVE  Modality rationale: Biofeedback for retraining of muscle control on biofeedback to increase pelvic floor muscle strength, decrease urinary incontinence and nocturia   Min Type Additional Details   28 [x] Biofeedback x 28 minutes    sit surface    [] Estim: []Att   []Unatt        []TENS instruct                  []IFC  []Premod   []NMES                     []Other:  []w/US   []w/ice   []w/heat  Position:  Location:    []  Traction: [] Cervical       []Lumbar                       [] Prone          []Supine                       []Intermittent   []Continuous Lbs:  [] before manual  [] after manual    []  Ultrasound: []Continuous   [] Pulsed                           []1MHz   []3MHz Location:  W/cm2:    []  Iontophoresis with dexamethasone         Location: [] Take home patch   [] In clinic    []  Ice     []  heat  []  Ice massage Position:  Location:    []  Vasopneumatic Device Pressure:       [] lo [] med [] hi   Temperature: [] lo [] med [] hi   [x] Skin assessment post-treatment:  []intact []redness- no adverse reaction       []redness - adverse reaction:     28 min Therapeutic Exercise:  [x] See flow sheet :  [x]  Pelvic floor strengthening []  Pelvic floor downtraining  []  Quality pelvic floor contractions      []  Relaxation techniques  []  Urge suppression exercises  []  Other:    Rationale: increase strength, improve coordination and increase proprioception to improve the patients ability to decrease urinary incontinence              with NM and TE min Patient Education: [x] Review HEP    [] Progressed/Changed HEP based on:   [] positioning   [] body mechanics   [] transfers   [] heat/ice application        Other Objective/Functional Measures:    []baseline resting tone: -   [x]slow twitch mms 15.8 ( 11.4)   [x]fast twitch mms 15.1 (7.99)    Pain Level (0-10 scale) post treatment: 0/10    ASSESSMENT/Changes in Function: advanced HEP 3x per day in sitting 10x 7 second hold. Advanced core stabilization strengthening per flow sheet. Patient will continue to benefit from skilled PT services to modify and progress therapeutic interventions, address functional mobility deficits, address ROM deficits, address strength deficits, analyze and address soft tissue restrictions and instruct in home and community integration to attain remaining goals.      []  See Plan of Care  [x]  See progress note/recertification  []  See Discharge Summary         Progress towards goals / Updated goals:  See progress note    PLAN  [x]  Upgrade activities as tolerated     [x]  Continue plan of care  []  Update interventions per flow sheet       []  Discharge due to:_  []  Other:_      Alonso Nichole PTA 5/3/2019  4:18  PM       Future Appointments   Date Time Provider Hiro Win   5/6/2019  1:00 PM Bianca Roberson PTA [facility]   5/13/2019  1:00 PM Bianca Roberson PTA [facility]   5/20/2019  1:00 PM Bianca Roberson PTA [facility]   7/29/2019  8:00 AM Reji, Bert Saxena, NP [address] [facility]

## 2019-05-06 ENCOUNTER — HOSPITAL ENCOUNTER (OUTPATIENT)
Dept: PHYSICAL THERAPY | Age: 44
Discharge: HOME | End: 2019-05-06
Payer: MEDICAID

## 2019-05-06 PROCEDURE — 97112 NEUROMUSCULAR REEDUCATION: CPT

## 2019-05-06 NOTE — PROGRESS NOTES
PELVIC FLOOR DAILY TREATMENT NOTE 8-    Patient Name: Ana Altamirano  Date:2019  : 1975  [x]  Patient  Verified  Payor: Florian Howard / [place]: [place] [employer] [employer] / Product Type: Managed Care Medicaid /    In time:1:13 pm Out time:1: 38  Total Treatment Time (min): 25  Total Timed Codes (min): 25  1:1 Treatment Time (min): -  Visit #:6of 8    Treatment Area: Stress incontinence (female) (male) [N39.3]    SUBJECTIVE  Pain Level (0-10 scale): 0  Any medication changes, allergies to medications, adverse drug reactions, diagnosis change, or new procedure performed?: [x] No    [] Yes (see summary sheet for update)  Subjective functional status/changes:   [] No changes reported  Pt reports it was difficult finding the right muscles while doing the exercises in sitting. Doing 3x per day.     OBJECTIVE  Modality rationale: Biofeedback for retraining of muscle control on biofeedback to increase pelvic floor muscle strength, decrease urinary incontinence and nocturia   Min Type Additional Details   25 [x] Biofeedback x 25 minutes    sit surface    [] Estim: []Att   []Unatt        []TENS instruct                  []IFC  []Premod   []NMES                     []Other:  []w/US   []w/ice   []w/heat  Position:  Location:    []  Traction: [] Cervical       []Lumbar                       [] Prone          []Supine                       []Intermittent   []Continuous Lbs:  [] before manual  [] after manual    []  Ultrasound: []Continuous   [] Pulsed                           []1MHz   []3MHz Location:  W/cm2:    []  Iontophoresis with dexamethasone         Location: [] Take home patch   [] In clinic    []  Ice     []  heat  []  Ice massage Position:  Location:    []  Vasopneumatic Device Pressure:       [] lo [] med [] hi   Temperature: [] lo [] med [] hi   [x] Skin assessment post-treatment:  []intact []redness- no adverse reaction       []redness - adverse reaction:             with NE min Patient Education: [x] Review HEP    [] Progressed/Changed HEP based on:   [] positioning   [] body mechanics   [] transfers   [] heat/ice application        Other Objective/Functional Measures:    []baseline resting tone:   [x]slow twitch mms 14.0 ( 10.6)   [x]fast twitch mms 15.0 ( 8.96)    Pain Level (0-10 scale) post treatment: 0/10    ASSESSMENT/Changes in Function: pt deferred core strengthening this visit secondary to reporting fatigue after biofeedback exercise. Adjusted HEP for 2x per day sitting, 1x per day supine for slow twitch muscle contractions. Pt demonstrating improving pelvic floor strength in slow and fast twitch muscle contraction  Patient will continue to benefit from skilled PT services to modify and progress therapeutic interventions, address functional mobility deficits, address ROM deficits and instruct in home and community integration to attain remaining goals.      []  See Plan of Care  [x]  See progress note/recertification  []  See Discharge Summary         Progress towards goals / Updated goals:  Resume there ex as tolerated    PLAN  [x]  Upgrade activities as tolerated     [x]  Continue plan of care  []  Update interventions per flow sheet       []  Discharge due to:_  []  Other:_      Yulia Hubbard PTA 5/6/2019  1:14 PM      Future Appointments   Date Time Provider Hiro Win   5/13/2019  1:00 PM Ethan Weiss PTA [facility]   5/20/2019  1:00 PM Ethan Weiss PTA [facility]   7/29/2019  8:00 AM Reji, Rhianna Newton, NP [address] [facility]

## 2019-05-13 ENCOUNTER — APPOINTMENT (OUTPATIENT)
Dept: PHYSICAL THERAPY | Age: 44
End: 2019-05-13
Payer: MEDICAID

## 2019-05-20 ENCOUNTER — HOSPITAL ENCOUNTER (OUTPATIENT)
Dept: PHYSICAL THERAPY | Age: 44
Discharge: HOME | End: 2019-05-20
Payer: MEDICAID

## 2019-05-20 PROCEDURE — 97140 MANUAL THERAPY 1/> REGIONS: CPT

## 2019-05-20 PROCEDURE — 97112 NEUROMUSCULAR REEDUCATION: CPT

## 2019-05-20 NOTE — PROGRESS NOTES
PELVIC FLOOR DAILY TREATMENT NOTE 8-14    Patient Name: Eladia Swift  Date:2019  : 1975  [x]  Patient  Verified  Payor: Reece John / Plan: Timothy Sanchez / Product Type: Managed Care Medicaid /    In time:1:07 pm  Out time:1:45 pm  Total Treatment Time (min): 34  Total Timed Codes (min): 34  1:1 Treatment Time (min):34  Visit #:7 of 8    Treatment Area: Stress incontinence (female) (male) [N39.3]    SUBJECTIVE  Pain Level (0-10 scale): 6/10- low back pain. Any medication changes, allergies to medications, adverse drug reactions, diagnosis change, or new procedure performed?: [] No    [x] Yes (see summary sheet for update)  Subjective functional status/changes:   [] No changes reported  Pt reports she went to ER Tuesday or Wednesday and follow up with regular MD on Thursday. They said to consult therapist. No PF exercise since ER visit.      OBJECTIVE  Modality rationale: Biofeedback for retraining of muscle control on biofeedback to increase pelvic floor muscle strength, decrease urinary incontinence and nocturia   Min Type Additional Details   24 [x] Biofeedback x 24 minutes    sit surface    [] Estim: []Att   []Unatt        []TENS instruct                  []IFC  []Premod   []NMES                     []Other:  []w/US   []w/ice   []w/heat  Position:  Location:    []  Traction: [] Cervical       []Lumbar                       [] Prone          []Supine                       []Intermittent   []Continuous Lbs:  [] before manual  [] after manual    []  Ultrasound: []Continuous   [] Pulsed                           []1MHz   []3MHz Location:  W/cm2:    []  Iontophoresis with dexamethasone         Location: [] Take home patch   [] In clinic    []  Ice     []  heat  []  Ice massage Position:  Location:    []  Vasopneumatic Device Pressure:       [] lo [] med [] hi   Temperature: [] lo [] med [] hi   [] Skin assessment post-treatment:  []intact []redness- no adverse reaction       []redness - adverse reaction:         8 min Manual Therapy: corrected right anterior innominate with MET   Rationale: improve tissue mobility in ADLs       2 min Patient Education: [x] Review HEP    [] Progressed/Changed HEP based on:   [] positioning   [] body mechanics   [] transfers   [] heat/ice application        Other Objective/Functional Measures:    []baseline resting tone: -   []slow twitch mms -   [x]fast twitch mms3.71 ( 1.61)    Pain Level (0-10 scale) post treatment: 4/10    ASSESSMENT/Changes in Function: updated Supervising  PT.trail fast twitch exercise on biofeedback only this session. Resume Fast twitch HEP as tolerated. Stop exercise with low back pain persists. Patient will continue to benefit from skilled PT services to modify and progress therapeutic interventions, address functional mobility deficits, address ROM deficits, address strength deficits, analyze and address soft tissue restrictions and instruct in home and community integration to attain remaining goals. []  See Plan of Care  [x]  See progress note/recertification  []  See Discharge Summary         Progress towards goals / Updated goals:  Pt instructed to perform fast twitch muscle contractions only for HEP as tolerated with not use of ball for accessory muscle assistance.      PLAN  [x]  Upgrade activities as tolerated     []  Continue plan of care  []  Update interventions per flow sheet       []  Discharge due to:_  []  Other:_      Cristino Pallas, PTA 5/20/2019  1:07 PM      Future Appointments   Date Time Provider Hiro Win   7/29/2019  8:00 AM Andrew Mendoza, VICKY [address] [facility]

## 2019-06-05 ENCOUNTER — HOSPITAL ENCOUNTER (OUTPATIENT)
Dept: PHYSICAL THERAPY | Age: 44
Discharge: HOME | End: 2019-06-05
Payer: MEDICAID

## 2019-06-05 PROCEDURE — 97112 NEUROMUSCULAR REEDUCATION: CPT

## 2019-06-05 NOTE — PROGRESS NOTES
PELVIC FLOOR DAILY TREATMENT NOTE 8-    Patient Name: Tory Shore  Date:2019  : 1975  [x]  Patient  Verified  Payor: Carlyn Orta / Plan: [id number] / Product Type: Managed Care Medicaid /    In time:5:44 pm  Out time:6:24 pm  Total Treatment Time (min): 49  Total Timed Codes (min): 49  1:1 Treatment Time (min): -  Visit #: 8 of 8    Treatment Area: Stress incontinence (female) (male) [N39.3]    SUBJECTIVE  Pain Level (0-10 scale): 0  Any medication changes, allergies to medications, adverse drug reactions, diagnosis change, or new procedure performed?: [x] No    [] Yes (see summary sheet for update)  Subjective functional status/changes:   [] No changes reported  Pt reports she had a UTI that has resolved. No issues with incontinence or nocturia. Performing fast twitch HEP only.      OBJECTIVE  Modality rationale: Biofeedback for retraining of muscle control on biofeedback to increase pelvic floor muscle strength, decrease urinary incontinence and nocturia   Min Type Additional Details   39 [x] Biofeedback x 39 minutes    sit surface    [] Estim: []Att   []Unatt        []TENS instruct                  []IFC  []Premod   []NMES                     []Other:  []w/US   []w/ice   []w/heat  Position:  Location:    []  Traction: [] Cervical       []Lumbar                       [] Prone          []Supine                       []Intermittent   []Continuous Lbs:  [] before manual  [] after manual    []  Ultrasound: []Continuous   [] Pulsed                           []1MHz   []3MHz Location:  W/cm2:    []  Iontophoresis with dexamethasone         Location: [] Take home patch   [] In clinic    []  Ice     []  heat  []  Ice massage Position:  Location:    []  Vasopneumatic Device Pressure:       [] lo [] med [] hi   Temperature: [] lo [] med [] hi   [x] Skin assessment post-treatment:  [x]intact []redness- no adverse reaction       []redness - adverse reaction:             10 min Patient Education: [x] Review HEP    [] Progressed/Changed HEP based on:   [] positioning   [] body mechanics   [] transfers   [] heat/ice application        Other Objective/Functional Measures:    []baseline resting tone:    [x]slow twitch mms 17.2 ( 14.7)   [x]fast twitch mms 15.6 ( 11.0_    Pain Level (0-10 scale) post treatment: 0    ASSESSMENT/Changes in Function: reviewed discharge instructions and updated written HEP. Patient will continue to benefit from skilled PT services to modify and progress therapeutic interventions, address functional mobility deficits and instruct in home and community integration to attain remaining goals.      []  See Plan of Care  []  See progress note/recertification  [x]  See Discharge Summary         Progress towards goals / Updated goals:  See dishcarge    PLAN  []  Upgrade activities as tolerated     []  Continue plan of care  []  Update interventions per flow sheet       [x]  Discharge due to:met goals  []  Other:_      Jacoby Hays PTA 6/5/2019  6:24  PM      Future Appointments   Date Time Provider Hiro Win   6/5/2019  5:30 PM Mick Zimmerman PTA [facility]   6/12/2019  5:30 PM Mick Zimmerman PTA [facility]   6/19/2019  5:00 PM Mick Zimmerman PTA [facility]   7/29/2019  8:00 AM Reji, Lucina Bosch, NP [address]

## 2019-06-05 NOTE — PROGRESS NOTES
[facility] [address], [address] [facility], [facility] [address] - Phone: [phone]  Fax: [phone]  Discharge Summary  Patient Name: Ricardo Galindo : 1975   Treatment/Medical Diagnosis: Stress incontinence (female) (male) [N39.3]   Referral Source: [place], NP     Date of Initial Visit: 19 Attended Visits: 8 Missed Visits: 0   SUMMARY OF TREATMENT  PT has consisted of pelvic floor relaxation/strengthening via biofeedback, education as to  pelvic floor anatomy and function/decrease urinary incontinence and nocturia, and home exercise program.   CURRENT STATUS    Patient has made slow steady progress  progress in PT with all long term goals. Functional progress  Includes patient reporting~  30-35 % improvement in urinary incontinence with sexual intercourse and Nocturia has decreased to 0 x nightly. Patient demonstrates improved pelvic floor muscle strength    Goal/Measure of Progress Goal Met? 1. Patient performing pelvic floor exercises 3x day   Status at last Eval: Pt performing HEP 2x per day Current Status: Pt performing HEP 2-3x per day yes   2. Patient will increase FOTO/Urinary problem to 63 indicating improved continence and quality of life. Status at last Eval: 57 Current Status: 75 yes   3. Increase net riseof fast  twitch contraction to 16 microvolts to increase continence. Status at last Eval: 12.5 Current Status: 15.6  Partially met   RECOMMENDATIONS  Discharge to HEP. Patient met all goals/progressing toward all goals. If you have any questions/comments please contact us directly at [phone]. Thank you for allowing us to assist in the care of your patient.     Therapist Signature: Yulia Hubbard, LISETTE Date: 2019    Dawn Rose PT Time: 5:27 PM     To ensure we are able to process the patients encounter and avoid risk of your patient receiving a bill for our services, please sign and return this discharge summary by 7/4. (30days following DC date)      Physician Signature:        Date:       Time:

## 2019-06-12 ENCOUNTER — APPOINTMENT (OUTPATIENT)
Dept: PHYSICAL THERAPY | Age: 44
End: 2019-06-12
Payer: MEDICAID

## 2019-06-19 ENCOUNTER — APPOINTMENT (OUTPATIENT)
Dept: PHYSICAL THERAPY | Age: 44
End: 2019-06-19
Payer: MEDICAID

## 2021-08-03 PROBLEM — I10 HYPERTENSION: Status: RESOLVED | Noted: 2021-08-03 | Resolved: 2021-08-03

## 2022-03-19 PROBLEM — R73.03 PRE-DIABETES: Status: ACTIVE | Noted: 2019-01-13

## 2022-03-19 PROBLEM — R19.4 CHANGE IN BOWEL HABITS: Status: ACTIVE | Noted: 2017-05-05

## 2022-03-19 PROBLEM — G47.33 OSA (OBSTRUCTIVE SLEEP APNEA): Status: ACTIVE | Noted: 2018-01-26

## 2022-03-21 ENCOUNTER — HOSPITAL ENCOUNTER (OUTPATIENT)
Dept: NUTRITION | Age: 47
Discharge: HOME | End: 2022-03-21
Payer: MEDICAID

## 2022-03-21 PROCEDURE — 97802 MEDICAL NUTRITION INDIV IN: CPT

## 2022-03-27 NOTE — PROGRESS NOTES
[address] [place] [address]     Nutrition Assessment - Medical Nutrition Therapy   Outpatient Initial Evaluation         Patient Name: Tristan Farr : 1975   Treatment Diagnosis: Type 2 diabetes     Referral Source: Paulette Lanes [place] [facility]): 2022     Gender: female Age: 40 y.o. Ht: 67 in Wt: 260 lb  kg   BMI: 44 BMR   Male  BMR Female 1857     Past Medical History:  RAKESH  HTN  Type 2 DM  GERD       Pertinent Medications:   Metformin     Biochemical Data:   Lab Results   Component Value Date/Time    Hemoglobin A1c 5.7 2012 09:46 AM     Lab Results   Component Value Date/Time    Sodium 141 2013 11:03 AM    Potassium 3.1 (L) 2017 01:10 PM    Chloride 101 2013 11:03 AM    CO2 30 2013 11:03 AM    Anion gap 10 2013 11:03 AM    Glucose 107 (H) 2013 11:03 AM    BUN 9 2013 11:03 AM    Creatinine 0.76 2013 11:03 AM    BUN/Creatinine ratio 12 2013 11:03 AM    GFR est AA >60 2013 11:03 AM    GFR est non-AA >60 2013 11:03 AM    Calcium 9.5 2013 11:03 AM    Bilirubin, total 0.5 2013 11:03 AM    Alk. phosphatase 75 2013 11:03 AM    Protein, total 7.7 2013 11:03 AM    Albumin 4.0 2013 11:03 AM    Globulin 3.7 2013 11:03 AM    A-G Ratio 1.1 2013 11:03 AM    ALT (SGPT) 18 2013 11:03 AM    AST (SGOT) 16 2013 11:03 AM     Lab Results   Component Value Date/Time    Cholesterol, total 164 2012 09:46 AM    HDL Cholesterol 30 (L) 2012 09:46 AM    LDL, calculated 104.6 (H) 2012 09:46 AM    VLDL, calculated 29.4 2012 09:46 AM    Triglyceride 147 2012 09:46 AM    CHOL/HDL Ratio 5.5 (H) 2012 09:46 AM     Lab Results   Component Value Date/Time    ALT (SGPT) 18 2013 11:03 AM    AST (SGOT) 16 2013 11:03 AM    Alk.  phosphatase 75 2013 11:03 AM    Bilirubin, total 0.5 2013 11:03 AM     Lab Results Component Value Date/Time    Creatinine 0.76 03/04/2013 11:03 AM     Lab Results   Component Value Date/Time    BUN 9 03/04/2013 11:03 AM    BUN, POC 4 (L) 05/05/2017 01:00 PM     No results found for: MCACR, MCA1, MCA2, MCA3, MCAU, MCAU2, MCALPOCT     Assessment:   Patient is a 40year old female who visits RD for insight on how to improve Hgb A1c and how to lose weight. Patient works as a  at [employer]. Patient's activity consists of a sedentary lifestyle. Patient's sleeping habits are in need of improvement. Food & Nutrition: Patient eats 3 meals a day and snacks. Breakfast may be soda, toast and diego. Lunch and dinner may be fast food. Snacks may be Pringles, BBQ chips, cheddar cheese. Patient consumes liquid calories consisting of soda, sweet tea and Minute Made juices. Estimate Needs   Calories: 1600 Protein: 60 Carbs: <140 Fat: 60   Kcal/day  g/day  g/day  g/day                            Nutrition Diagnosis Altered nutrition related laboratory values related to diabetes as evidenced by Hgb A1c of >6.0. Overweight/obesity related to excessive energy intake as evidenced by a BMI of 39. Undesirable food choices as evidenced by patients food recall; patient drinks soda and eats fast food. Disordered eating pattern; patient will skip meals at times. Excessive Carbohydrate intake related to food -and nutrition- knowledge deficit and food and nutrition compliance limitations as evidenced by Hyperglycemia (fasting BG > 126 mg/dL), and Hemoglobin A1c > 6%, and pt's food recall reveals high carbohydrate intake at meals and snacks. Nutrition Intervention &  Education: Encouraged pt to drink >64 ounces of only calorie free or very low calorie/carb beverages only. Educated pt on all carbohydrates found in foods and encouraged no more than <40 gm/meal and 15-20 gm/snack.   Patient has been educated on combining all carbohydrate containing food with a protein source and/or a vegetable source. Educated pt on the pathophysiology of Type II Diabetes and insulin resistance and the rationale for dietary modification and increased activity. Patient was educated on the importance of making lifestyle changes such as:  1. Eating off a smaller plate and drinking from smaller glasses. 2. Increasing activity. 3. Menu planning and tracking. Recommend My Fitness Pal for tracking. 4. The importance of eating breakfast.  5. Appropriate snacks. 6. Portion control. 7. The importance of good sleeping habits. 8. How to read a label. Patient was encouraged to do this before purchasing and consuming an item. Handouts Provided: [x]  Carbohydrates  [x]  Protein  [x]  Non-starchy Vegatbles  [x]  Food Label  [x]  Meal and Snack Ideas  [x]  Food Journals []  Diabetes  []  Cholesterol  []  Sodium  [x]  Gen Nutr Guidelines  []  SBGM Guidelines  []  Others:   Information Reviewed with: Patient    Readiness to Change Stage:   []  Pre-contemplative    []  Contemplative  [x]  Preparation               []  Action                  []  Maintenance   Potential Barriers to Learning: []  Decline in memory    []  Language barrier   []  Other:  []  Emotional                  []  Limited mobility  []  Lack of motivation     [] Vision, hearing or cognitive impairment   Expected Compliance: Good     Nutritional Goal - To promote lifestyle changes to result in:    [x]  Weight loss  [x]  Improved diabetic control  []  Decreased cholesterol levels  []  Decreased blood pressure  []  Weight maintenance []  Preventing any interactions associated with food allergies  []  Adequate weight gain toward goal weight  []  Other:        Patient Goals:   Patient desires to lose weight and improve HGB A1c. Dietitian Signature:  Rosa Isela Brand RD Date: 03/21/2022   Follow-up: Scheduled:  05/09/2022

## 2022-05-06 ENCOUNTER — TELEPHONE (OUTPATIENT)
Dept: NUTRITION | Age: 47
End: 2022-05-06

## 2022-05-09 ENCOUNTER — HOSPITAL ENCOUNTER (OUTPATIENT)
Dept: NUTRITION | Age: 47
End: 2022-05-09

## 2022-12-29 ENCOUNTER — TRANSCRIBE ORDER (OUTPATIENT)
Dept: SCHEDULING | Age: 47
End: 2022-12-29

## 2022-12-29 DIAGNOSIS — R92.8 ABNORMAL MAMMOGRAM: Primary | ICD-10-CM

## 2023-02-01 DIAGNOSIS — R92.8 ABNORMAL MAMMOGRAM: Primary | ICD-10-CM

## 2023-02-04 DIAGNOSIS — R92.8 ABNORMAL MAMMOGRAM: Primary | ICD-10-CM

## 2023-07-07 ENCOUNTER — OFFICE VISIT (OUTPATIENT)
Age: 48
End: 2023-07-07

## 2023-07-07 VITALS
SYSTOLIC BLOOD PRESSURE: 129 MMHG | HEIGHT: 67 IN | DIASTOLIC BLOOD PRESSURE: 90 MMHG | OXYGEN SATURATION: 98 % | BODY MASS INDEX: 39.87 KG/M2 | HEART RATE: 94 BPM | TEMPERATURE: 97 F | RESPIRATION RATE: 18 BRPM | WEIGHT: 254 LBS

## 2023-07-07 DIAGNOSIS — G47.33 OSA ON CPAP: ICD-10-CM

## 2023-07-07 DIAGNOSIS — R05.9 COUGH, UNSPECIFIED TYPE: Primary | ICD-10-CM

## 2023-07-07 DIAGNOSIS — K21.9 GASTROESOPHAGEAL REFLUX DISEASE, UNSPECIFIED WHETHER ESOPHAGITIS PRESENT: ICD-10-CM

## 2023-07-07 DIAGNOSIS — E66.9 OBESITY (BMI 30-39.9): ICD-10-CM

## 2023-07-07 DIAGNOSIS — Z99.89 OSA ON CPAP: ICD-10-CM

## 2023-07-07 RX ORDER — EMPAGLIFLOZIN 10 MG/1
10 TABLET, FILM COATED ORAL DAILY
COMMUNITY
Start: 2023-06-03

## 2023-07-07 RX ORDER — CETIRIZINE HYDROCHLORIDE 10 MG/1
TABLET ORAL
COMMUNITY
Start: 2023-05-15

## 2023-07-07 RX ORDER — AMLODIPINE BESYLATE 10 MG/1
TABLET ORAL
COMMUNITY
Start: 2023-05-24

## 2023-07-07 RX ORDER — AMITRIPTYLINE HYDROCHLORIDE 50 MG/1
TABLET, FILM COATED ORAL
COMMUNITY
Start: 2023-07-02

## 2023-07-07 RX ORDER — DULAGLUTIDE 0.75 MG/.5ML
INJECTION, SOLUTION SUBCUTANEOUS
COMMUNITY

## 2023-07-07 RX ORDER — ALBUTEROL SULFATE 90 UG/1
AEROSOL, METERED RESPIRATORY (INHALATION)
COMMUNITY
Start: 2023-05-01

## 2023-07-07 RX ORDER — DOXYCYCLINE HYCLATE 50 MG/1
CAPSULE ORAL
COMMUNITY
Start: 2023-06-13

## 2023-07-07 RX ORDER — LORATADINE 10 MG/1
10 TABLET ORAL DAILY
COMMUNITY
Start: 2023-05-28

## 2023-07-07 RX ORDER — LEVOFLOXACIN 500 MG/1
TABLET, FILM COATED ORAL
COMMUNITY
Start: 2023-05-22

## 2023-07-07 ASSESSMENT — ENCOUNTER SYMPTOMS
CHOKING: 0
EYE ITCHING: 0
SHORTNESS OF BREATH: 0
SINUS PRESSURE: 0
VOICE CHANGE: 0
CONSTIPATION: 0
NAUSEA: 0
APNEA: 0
EYE REDNESS: 0
COUGH: 1
STRIDOR: 0
FACIAL SWELLING: 0
PHOTOPHOBIA: 0
ABDOMINAL PAIN: 0
BACK PAIN: 0
VOMITING: 0
DIARRHEA: 0
COLOR CHANGE: 0
WHEEZING: 0
EYE DISCHARGE: 0
TROUBLE SWALLOWING: 0
CHEST TIGHTNESS: 0
BLOOD IN STOOL: 0
EYE PAIN: 0

## 2023-07-07 NOTE — PROGRESS NOTES
Alla Lewis (:  1975) is a 53 y.o. female,New patient, here for evaluation of the following chief complaint(s):  Cough and New Patient (Referred by NP Danelle Egan for chronic cough)         ASSESSMENT/PLAN:  1. Cough, unspecified type  -     AMB POC SPIROMETRY W/O BRONCHODILATOR  2. MACIE on CPAP  3. Gastroesophageal reflux disease, unspecified whether esophagitis present  4. Obesity (BMI 30-39. 9)    Pt with cough, spontaneously resolved. Suspect cough due to upper airway cough syndrome vs GERD given pt history of congestion and post nasal drip sensation, as well as known history of GERD. Monitor for recurrence of cough and start therapy for GERD. Spirometry shows normal flows which do not rule out Asthma, however pt lacks a prior history or triggers for asthma, which at this time is less likely than UACS or GERD. Pt instructed to use Albuterol prn for wheezing or SOB associated with cough. Encouraged nightly CPAP use and follow up with own sleep MD.   Healthy weight discussion. Return if symptoms worsen or fail to improve. Subjective   SUBJECTIVE/OBJECTIVE:  Pt is a never smoker with CC of persistent cough. Symptoms started in early 2023 and did not respond to various prescription and OTC medications including Cetirizine, Flonase, Tessalon, and Albuterol. Symptoms included frequent throat clearing, nasal congestion and post nasal drip sensation. Pt denies SOB, wheezing or phlegm production. No fever. PMH includes GERD, hypertension controlled on medication. Of note, pt with history of angioedema from Spironolactone and ACE inhibitor. Pt is a non smoker. She works as a teacher. She has a dog but no cats or birds. Review of Systems   Constitutional:  Negative for activity change, appetite change, chills, diaphoresis, fatigue, fever and unexpected weight change. HENT:  Positive for postnasal drip and sneezing.  Negative for ear discharge, facial swelling, hearing loss,

## 2023-07-07 NOTE — PROGRESS NOTES
Hanane York presents today for   Chief Complaint   Patient presents with    Cough    New Patient     Referred by NP Joan Kaur for chronic cough       Is someone accompanying this pt? No    Is the patient using any DME equipment during OV? CPAP-[facility]    -Susie Kimball    Depression Screening:    No flowsheet data found. Learning Needs Questionnaire:     Who is the primary learner? Patient    What is the preferred language for health care of the primary learner? ENGLISH    How does the primary learner prefer to learn new concepts? DEMONSTRATION    Answered By Patient    Relationship to Learner SELF          Fall Risk:     No flowsheet data found. Abuse Screening:     No flowsheet data found. Coordination of Care:    1. Have you been to the ER, urgent care clinic since your last visit? Hospitalized since your last visit? No    2. Have you seen or consulted any other health care providers outside of the [address] [place] [address] since your last visit? Include any pap smears or colon screening. PCP    Medication list has been update per patient.

## 2023-12-28 ENCOUNTER — OFFICE VISIT (OUTPATIENT)
Age: 48
End: 2023-12-28

## 2023-12-28 VITALS
OXYGEN SATURATION: 94 % | TEMPERATURE: 97.3 F | HEART RATE: 76 BPM | BODY MASS INDEX: 40.37 KG/M2 | HEIGHT: 67 IN | RESPIRATION RATE: 16 BRPM | SYSTOLIC BLOOD PRESSURE: 126 MMHG | WEIGHT: 257.2 LBS | DIASTOLIC BLOOD PRESSURE: 75 MMHG

## 2023-12-28 DIAGNOSIS — I11.9 HYPERTENSIVE HEART DISEASE WITHOUT HEART FAILURE: ICD-10-CM

## 2023-12-28 DIAGNOSIS — I51.7 CARDIOMEGALY: ICD-10-CM

## 2023-12-28 DIAGNOSIS — I51.89 DIASTOLIC DYSFUNCTION: ICD-10-CM

## 2023-12-28 DIAGNOSIS — R60.0 LOWER EXTREMITY EDEMA: Primary | ICD-10-CM

## 2023-12-28 RX ORDER — FUROSEMIDE 20 MG/1
20 TABLET ORAL DAILY PRN
Qty: 25 TABLET | Refills: 1 | Status: SHIPPED | OUTPATIENT
Start: 2023-12-28

## 2023-12-28 NOTE — PROGRESS NOTES
Terry Iglesias (:  1975) is a 53 y.o. female, with history significant for [place], cardiomegaly, GERD, systolic murmur, sleep apnea, diastolic dysfunction who presents for lower extremity edema. She also has a history of atypical type chest discomfort and underwent catheterization in 2019 that was without evidence of active coronary disease. She was last seen by Dr. Janey Closs on 2023 and had been noted to be doing well at that time. Most recent ECHO 2018 with normal LV function, LVEF 60%, mild concentric hypertrophy, normal RV size and function, mild tricuspid regurgitation, normal PA pressure. Per chart review, she was seen in the ED yesterday for ankle swelling. Lab work was largely unremarkable including BNP less than 36. CXR was without acute disease. She was sent home, no medication changes at this time. Kerry Ish states she noticed her ankles started to swell after her trip to [place] where she went to [employer]. She tried compression socks and this did not help. She states since being home from her trip, the ankle swelling has gotten a bit better but still remains with some. She has had this issue before and this comes and goes. She reports that this would happen even prior to being on amlodipine. She denies chest pain, palpitations, shortness of breath, abdominal pain, nausea, vomiting, fevers/chills, dizziness or lightheadedness, orthopnea, PND, claudication, syncope or presyncope. She denies injury.      Subjective   SUBJECTIVE/OBJECTIVE:  HPI  See above    Past Medical History:   Diagnosis Date    Acute cystitis without hematuria     Acute gastritis without hemorrhage     Depression     Dyspareunia, female     GERD (gastroesophageal reflux disease)     HTN (hypertension)     Hypertension     Hypokalemia     Low blood potassium     Menorrhagia     Nocturia     Sleep apnea     uses CPAP    Stress incontinence     Urinary incontinence     unspecified type    UTI

## 2023-12-28 NOTE — PATIENT INSTRUCTIONS
Medication Changes:  Start taking lasix 20mg per day for 3 days    Other recommendations:  Low sodium diet   Leg elevation  Increase activity     Patient Education        DASH Diet: Care Instructions  Your Care Instructions     The DASH diet is an eating plan that can help lower your blood pressure. DASH stands for Dietary Approaches to Stop Hypertension. Hypertension is high blood pressure. The DASH diet focuses on eating foods that are high in calcium, potassium, and magnesium. These nutrients can lower blood pressure. The foods that are highest in these nutrients are fruits, vegetables, low-fat dairy products, nuts, seeds, and legumes. But taking calcium, potassium, and magnesium supplements instead of eating foods that are high in those nutrients does not have the same effect. The DASH diet also includes whole grains, fish, and poultry. The DASH diet is one of several lifestyle changes your doctor may recommend to lower your high blood pressure. Your doctor may also want you to decrease the amount of sodium in your diet. Lowering sodium while following the DASH diet can lower blood pressure even further than just the DASH diet alone. Follow-up care is a key part of your treatment and safety. Be sure to make and go to all appointments, and call your doctor if you are having problems. It's also a good idea to know your test results and keep a list of the medicines you take. How can you care for yourself at home? Following the DASH diet  Eat 4 to 5 servings of fruit each day. A serving is 1 medium-sized piece of fruit, 1/2 cup raw or canned fruit, 1/4 cup dried fruit, or 4 ounces (1/2 cup) of fruit juice. Choose fruit more often than fruit juice. Eat 4 to 5 servings of vegetables each day. A serving is 1 cup of lettuce or raw leafy vegetables, 1/2 cup of chopped or cooked vegetables, or 4 ounces (1/2 cup) of vegetable juice. Choose vegetables more often than vegetable juice.   Get 2 to 3 servings of low-fat

## 2024-02-28 ENCOUNTER — OFFICE VISIT (OUTPATIENT)
Age: 49
End: 2024-02-28

## 2024-02-28 VITALS
HEIGHT: 67 IN | DIASTOLIC BLOOD PRESSURE: 82 MMHG | OXYGEN SATURATION: 99 % | SYSTOLIC BLOOD PRESSURE: 140 MMHG | WEIGHT: 257.2 LBS | TEMPERATURE: 98.2 F | RESPIRATION RATE: 16 BRPM | HEART RATE: 109 BPM | BODY MASS INDEX: 40.37 KG/M2

## 2024-02-28 DIAGNOSIS — R60.0 BILATERAL LEG EDEMA: ICD-10-CM

## 2024-02-28 DIAGNOSIS — R07.89 OTHER CHEST PAIN: ICD-10-CM

## 2024-02-28 DIAGNOSIS — R01.1 SYSTOLIC MURMUR: ICD-10-CM

## 2024-02-28 DIAGNOSIS — R00.2 PALPITATIONS: ICD-10-CM

## 2024-02-28 DIAGNOSIS — G47.33 OBSTRUCTIVE SLEEP APNEA: ICD-10-CM

## 2024-02-28 DIAGNOSIS — I10 PRIMARY HYPERTENSION: Primary | ICD-10-CM

## 2024-02-28 DIAGNOSIS — I51.89 DIASTOLIC DYSFUNCTION: ICD-10-CM

## 2024-02-28 RX ORDER — AMMONIUM LACTATE 12 G/100G
CREAM TOPICAL PRN
COMMUNITY

## 2024-02-28 RX ORDER — CLINDAMYCIN PHOSPHATE 10 UG/ML
LOTION TOPICAL
COMMUNITY

## 2024-02-28 RX ORDER — OMEGA-3 FATTY ACIDS/FISH OIL 300-1000MG
500 CAPSULE ORAL PRN
COMMUNITY

## 2024-02-28 RX ORDER — ERGOCALCIFEROL 1.25 MG/1
50000 CAPSULE ORAL WEEKLY
COMMUNITY
Start: 2024-02-21

## 2024-02-28 RX ORDER — AMITRIPTYLINE HYDROCHLORIDE 25 MG/1
25 TABLET, FILM COATED ORAL 2 TIMES DAILY PRN
COMMUNITY
Start: 2024-02-09

## 2024-02-28 ASSESSMENT — ENCOUNTER SYMPTOMS
ALLERGIC/IMMUNOLOGIC NEGATIVE: 1
EYES NEGATIVE: 1
GASTROINTESTINAL NEGATIVE: 1
SHORTNESS OF BREATH: 1

## 2024-02-28 NOTE — PROGRESS NOTES
General: Abdomen is flat. Bowel sounds are normal.      Palpations: Abdomen is soft.   Musculoskeletal:         General: Normal range of motion.      Cervical back: Normal range of motion and neck supple.      Right lower le+ Pitting Edema present.      Left lower le+ Pitting Edema present.   Lymphadenopathy:      Cervical: No cervical adenopathy.   Skin:     General: Skin is warm and dry.      Capillary Refill: Capillary refill takes 2 to 3 seconds.   Neurological:      General: No focal deficit present.      Mental Status: She is alert and oriented to person, place, and time.   Psychiatric:         Mood and Affect: Mood normal.       ASSESSMENT/PLAN:  1. Primary hypertension  2. Systolic murmur  3. Palpitations  4. Obstructive sleep apnea  5. Diastolic dysfunction  6. Other chest pain  7. Bilateral leg edema    Patient's edema is likely result of a combination of dietary indiscretions, side effects from amlodipine and obesity.  I did discuss with her potential for weight loss as this will improve her blood pressure control as well.  She does have evidence of whitecoat syndrome and states blood pressures at home are no higher than 132 mmHg systolic.  Recommend conservative management but I did emphasize the need for better diet and increase activity and decrease stress.    No results found for any visits on 24.     Return in about 1 year (around 2025) for follow up.      On this date 2024 I have spent 42 minutes reviewing previous notes, test results and face to face with the patient discussing the diagnosis and importance of compliance with the treatment plan as well as documenting on the day of the visit.      An electronic signature was used to authenticate this note.    --John Coreas MD

## 2024-07-15 ENCOUNTER — OFFICE VISIT (OUTPATIENT)
Age: 49
End: 2024-07-15
Payer: COMMERCIAL

## 2024-07-15 VITALS
DIASTOLIC BLOOD PRESSURE: 85 MMHG | BODY MASS INDEX: 40.97 KG/M2 | HEART RATE: 99 BPM | HEIGHT: 67 IN | SYSTOLIC BLOOD PRESSURE: 135 MMHG | WEIGHT: 261 LBS | TEMPERATURE: 97.6 F | OXYGEN SATURATION: 99 %

## 2024-07-15 DIAGNOSIS — R00.2 PALPITATIONS: Primary | ICD-10-CM

## 2024-07-15 DIAGNOSIS — I10 PRIMARY HYPERTENSION: ICD-10-CM

## 2024-07-15 DIAGNOSIS — G47.33 OSA (OBSTRUCTIVE SLEEP APNEA): ICD-10-CM

## 2024-07-15 PROCEDURE — 99214 OFFICE O/P EST MOD 30 MIN: CPT

## 2024-07-15 PROCEDURE — 3079F DIAST BP 80-89 MM HG: CPT

## 2024-07-15 PROCEDURE — 3075F SYST BP GE 130 - 139MM HG: CPT

## 2024-07-15 RX ORDER — DOXYCYCLINE HYCLATE 50 MG/1
CAPSULE ORAL
COMMUNITY

## 2024-07-15 RX ORDER — KETOCONAZOLE 20 MG/G
CREAM TOPICAL
COMMUNITY
Start: 2024-07-06

## 2024-07-15 RX ORDER — DULAGLUTIDE 0.75 MG/.5ML
INJECTION, SOLUTION SUBCUTANEOUS
COMMUNITY
Start: 2024-06-24

## 2024-07-15 ASSESSMENT — PATIENT HEALTH QUESTIONNAIRE - PHQ9
SUM OF ALL RESPONSES TO PHQ9 QUESTIONS 1 & 2: 0
1. LITTLE INTEREST OR PLEASURE IN DOING THINGS: NOT AT ALL
SUM OF ALL RESPONSES TO PHQ QUESTIONS 1-9: 0
SUM OF ALL RESPONSES TO PHQ QUESTIONS 1-9: 0
2. FEELING DOWN, DEPRESSED OR HOPELESS: NOT AT ALL
SUM OF ALL RESPONSES TO PHQ QUESTIONS 1-9: 0
SUM OF ALL RESPONSES TO PHQ QUESTIONS 1-9: 0

## 2024-07-15 ASSESSMENT — ENCOUNTER SYMPTOMS
SORE THROAT: 0
ABDOMINAL PAIN: 0
COUGH: 0
VOMITING: 0
NAUSEA: 0
DIARRHEA: 0
SHORTNESS OF BREATH: 0
WHEEZING: 0
RHINORRHEA: 0

## 2024-07-15 NOTE — PATIENT INSTRUCTIONS
or black beans.  Where can you learn more?  Go to [email or web address]://[email or web address] [employer].net/patientEd and enter H967 to learn more about \"DASH Diet: Care Instructions.\"  Current as of: September 20, 2023  Content Version: 14.1  © 1209-1716 [employer].   Care instructions adapted under license by [employer]. If you have questions about a medical condition or this instruction, always ask your healthcare professional. [employer], [employer] disclaims any warranty or liability for your use of this information.

## 2024-07-15 NOTE — PROGRESS NOTES
1. \"Have you been to the ER, urgent care clinic since your last visit?  Hospitalized since your last visit?\" Reviewed by Ashlyn CORTEZ    2. \"Have you seen or consulted any other health care providers outside of the [facility] since your last visit?\" Reviewed by Hammad CORTEZ  
AFFECTED AREA OF FACE, UNDERARMS, GROIN ONCE TO TWICE DAILY      ammonium lactate (AMLACTIN) 12 % cream as needed      furosemide (LASIX) 20 MG tablet Take 1 tablet by mouth daily as needed (lower extremity edema) (Patient not taking: Reported on 7/15/2024) 25 tablet 1    amitriptyline (ELAVIL) 50 MG tablet TAKE 1 TABLET BY MOUTH AT BEDTIME ONCE A DAY      JARDIANCE 10 MG tablet Take 1 tablet by mouth daily (Patient not taking: Reported on 7/15/2024)      levoFLOXacin (LEVAQUIN) 500 MG tablet  (Patient not taking: Reported on 7/15/2024)       No current facility-administered medications for this visit.        Social History     Tobacco Use    Smoking status: Never    Smokeless tobacco: Never   Vaping Use    Vaping Use: Never used   Substance Use Topics    Alcohol use: Yes    Drug use: No        No family history on file.     Review of Systems   Constitutional:  Negative for activity change, appetite change, chills, diaphoresis, fatigue and fever.   HENT:  Negative for congestion, rhinorrhea and sore throat.    Eyes:  Negative for visual disturbance.   Respiratory:  Negative for cough, shortness of breath and wheezing.    Cardiovascular:  Positive for palpitations. Negative for chest pain and leg swelling.   Gastrointestinal:  Negative for abdominal pain, diarrhea, nausea and vomiting.   Genitourinary:  Negative for difficulty urinating and frequency.   Musculoskeletal:  Negative for myalgias.   Skin:  Negative for rash and wound.   Neurological:  Negative for dizziness, syncope, light-headedness and headaches.   Psychiatric/Behavioral:  Negative for agitation and confusion. The patient is not nervous/anxious.        Physical Exam  HENT:      Head: Normocephalic and atraumatic.      Mouth/Throat:      Mouth: Mucous membranes are moist.   Eyes:      Extraocular Movements: Extraocular movements intact.   Cardiovascular:      Rate and Rhythm: Normal rate and regular rhythm.      Pulses: Normal pulses.      Heart sounds:

## 2024-08-09 ENCOUNTER — TELEPHONE (OUTPATIENT)
Age: 49
End: 2024-08-09

## 2024-08-09 DIAGNOSIS — R00.2 PALPITATIONS: ICD-10-CM

## 2024-08-09 NOTE — TELEPHONE ENCOUNTER
Call Patient and used the two identifiers. Communicated to patient that  heart monitor had no findings. Patient state that she will keep  her appointment  for September.  No further action is needed.

## 2024-09-16 ENCOUNTER — OFFICE VISIT (OUTPATIENT)
Age: 49
End: 2024-09-16
Payer: COMMERCIAL

## 2024-09-16 VITALS
SYSTOLIC BLOOD PRESSURE: 133 MMHG | TEMPERATURE: 97.6 F | HEIGHT: 67 IN | OXYGEN SATURATION: 97 % | WEIGHT: 274 LBS | HEART RATE: 88 BPM | BODY MASS INDEX: 43 KG/M2 | DIASTOLIC BLOOD PRESSURE: 87 MMHG

## 2024-09-16 DIAGNOSIS — I51.89 DIASTOLIC DYSFUNCTION: ICD-10-CM

## 2024-09-16 DIAGNOSIS — R60.0 BILATERAL LEG EDEMA: ICD-10-CM

## 2024-09-16 DIAGNOSIS — R01.1 SYSTOLIC MURMUR: ICD-10-CM

## 2024-09-16 DIAGNOSIS — R07.89 OTHER CHEST PAIN: ICD-10-CM

## 2024-09-16 DIAGNOSIS — G47.33 OBSTRUCTIVE SLEEP APNEA: ICD-10-CM

## 2024-09-16 DIAGNOSIS — I10 PRIMARY HYPERTENSION: ICD-10-CM

## 2024-09-16 DIAGNOSIS — I51.7 CARDIOMEGALY: ICD-10-CM

## 2024-09-16 DIAGNOSIS — R00.2 PALPITATIONS: Primary | ICD-10-CM

## 2024-09-16 PROCEDURE — 3075F SYST BP GE 130 - 139MM HG: CPT | Performed by: INTERNAL MEDICINE

## 2024-09-16 PROCEDURE — 3079F DIAST BP 80-89 MM HG: CPT | Performed by: INTERNAL MEDICINE

## 2024-09-16 PROCEDURE — 99214 OFFICE O/P EST MOD 30 MIN: CPT | Performed by: INTERNAL MEDICINE

## 2024-09-16 RX ORDER — FLUCONAZOLE 150 MG/1
TABLET ORAL
COMMUNITY
Start: 2024-02-13

## 2024-09-16 RX ORDER — BUPROPION HYDROCHLORIDE 150 MG/1
TABLET ORAL
COMMUNITY
Start: 2024-09-13

## 2024-09-16 RX ORDER — CETIRIZINE HYDROCHLORIDE 10 MG/1
TABLET ORAL
COMMUNITY
Start: 2023-05-15

## 2024-09-16 RX ORDER — DEXTROMETHORPHAN HYDROBROMIDE AND PROMETHAZINE HYDROCHLORIDE 15; 6.25 MG/5ML; MG/5ML
SYRUP ORAL
COMMUNITY
Start: 2023-05-22

## 2024-09-16 RX ORDER — POLYMYXIN B SULFATE AND TRIMETHOPRIM 1; 10000 MG/ML; [USP'U]/ML
SOLUTION OPHTHALMIC
COMMUNITY
Start: 2024-08-15

## 2024-09-16 RX ORDER — CEPHALEXIN 500 MG/1
CAPSULE ORAL
COMMUNITY
Start: 2024-08-25

## 2024-09-16 RX ORDER — PHENAZOPYRIDINE HYDROCHLORIDE 200 MG/1
TABLET, FILM COATED ORAL
COMMUNITY
Start: 2024-08-25

## 2024-09-16 ASSESSMENT — PATIENT HEALTH QUESTIONNAIRE - PHQ9
2. FEELING DOWN, DEPRESSED OR HOPELESS: NOT AT ALL
SUM OF ALL RESPONSES TO PHQ QUESTIONS 1-9: 0
SUM OF ALL RESPONSES TO PHQ QUESTIONS 1-9: 0
1. LITTLE INTEREST OR PLEASURE IN DOING THINGS: NOT AT ALL
SUM OF ALL RESPONSES TO PHQ9 QUESTIONS 1 & 2: 0
SUM OF ALL RESPONSES TO PHQ QUESTIONS 1-9: 0
SUM OF ALL RESPONSES TO PHQ QUESTIONS 1-9: 0

## 2024-09-16 ASSESSMENT — ENCOUNTER SYMPTOMS
ALLERGIC/IMMUNOLOGIC NEGATIVE: 1
SHORTNESS OF BREATH: 1
GASTROINTESTINAL NEGATIVE: 1
EYES NEGATIVE: 1

## (undated) DEVICE — BASIN EMESIS 500CC ROSE 250/CS 60/PLT: Brand: MEDEGEN MEDICAL PRODUCTS, LLC

## (undated) DEVICE — MEDI-VAC NON-CONDUCTIVE SUCTION TUBING: Brand: CARDINAL HEALTH

## (undated) DEVICE — CONTAINER PREFIL FRMLN 15ML --

## (undated) DEVICE — FLEX ADVANTAGE 1500CC: Brand: FLEX ADVANTAGE

## (undated) DEVICE — REM POLYHESIVE ADULT PATIENT RETURN ELECTRODE: Brand: VALLEYLAB

## (undated) DEVICE — KIT COLON W/ 1.1OZ LUB AND 2 END

## (undated) DEVICE — SYR 50ML SLIP TIP NSAF LF STRL --

## (undated) DEVICE — SNARE ENDOSCP POLYP MED STD AD 2.4X27X240 CM 2.8 MM OVL SENS

## (undated) DEVICE — KENDALL 500 SERIES DIAPHORETIC FOAM MONITORING ELECTRODE - TEAR DROP SHAPE ( 30/PK): Brand: KENDALL

## (undated) DEVICE — DEVICE INFL 60ML 12ATM CONVENIENT LOK REL HNDL HI PRSS FLX

## (undated) DEVICE — TRAP SPEC COLL POLYP POLYSTYR --